# Patient Record
Sex: MALE | URBAN - NONMETROPOLITAN AREA
[De-identification: names, ages, dates, MRNs, and addresses within clinical notes are randomized per-mention and may not be internally consistent; named-entity substitution may affect disease eponyms.]

---

## 2021-05-14 ENCOUNTER — TELEPHONE (OUTPATIENT)
Dept: PRIMARY CARE CLINIC | Age: 61
End: 2021-05-14

## 2021-05-14 NOTE — TELEPHONE ENCOUNTER
Patient states Amlodipine 5mg was increased to 10mg but is still not controlling his blood pressure. He states he was put on a new injection for diabetes and no difference controlling sugars - last night glucose was 251 and this morning 171.

## 2021-05-14 NOTE — TELEPHONE ENCOUNTER
Can you clarify with this person they are date of birth and and make sure this is the proper medical chart.   There is no listing for medications or visits prior to this message in this chart